# Patient Record
Sex: FEMALE | Race: ASIAN | NOT HISPANIC OR LATINO | Employment: OTHER
[De-identification: names, ages, dates, MRNs, and addresses within clinical notes are randomized per-mention and may not be internally consistent; named-entity substitution may affect disease eponyms.]

---

## 2017-06-10 ENCOUNTER — HEALTH MAINTENANCE LETTER (OUTPATIENT)
Age: 57
End: 2017-06-10

## 2017-07-06 ENCOUNTER — SURGERY (OUTPATIENT)
Age: 57
End: 2017-07-06

## 2017-07-06 ENCOUNTER — HOSPITAL ENCOUNTER (OUTPATIENT)
Facility: CLINIC | Age: 57
Discharge: HOME | End: 2017-07-06
Attending: COLON & RECTAL SURGERY | Admitting: COLON & RECTAL SURGERY
Payer: COMMERCIAL

## 2017-07-06 VITALS
WEIGHT: 120 LBS | HEIGHT: 61 IN | DIASTOLIC BLOOD PRESSURE: 71 MMHG | SYSTOLIC BLOOD PRESSURE: 126 MMHG | RESPIRATION RATE: 12 BRPM | BODY MASS INDEX: 22.66 KG/M2 | OXYGEN SATURATION: 98 %

## 2017-07-06 LAB — COLONOSCOPY: NORMAL

## 2017-07-06 PROCEDURE — G0500 MOD SEDAT ENDO SERVICE >5YRS: HCPCS | Performed by: COLON & RECTAL SURGERY

## 2017-07-06 PROCEDURE — 45378 DIAGNOSTIC COLONOSCOPY: CPT | Performed by: COLON & RECTAL SURGERY

## 2017-07-06 PROCEDURE — G0121 COLON CA SCRN NOT HI RSK IND: HCPCS | Performed by: COLON & RECTAL SURGERY

## 2017-07-06 PROCEDURE — 25000128 H RX IP 250 OP 636: Performed by: COLON & RECTAL SURGERY

## 2017-07-06 RX ORDER — FENTANYL CITRATE 50 UG/ML
INJECTION, SOLUTION INTRAMUSCULAR; INTRAVENOUS PRN
Status: DISCONTINUED | OUTPATIENT
Start: 2017-07-06 | End: 2017-07-06 | Stop reason: HOSPADM

## 2017-07-06 RX ORDER — NALOXONE HYDROCHLORIDE 0.4 MG/ML
.1-.4 INJECTION, SOLUTION INTRAMUSCULAR; INTRAVENOUS; SUBCUTANEOUS
Status: DISCONTINUED | OUTPATIENT
Start: 2017-07-06 | End: 2017-07-06 | Stop reason: HOSPADM

## 2017-07-06 RX ORDER — FLUMAZENIL 0.1 MG/ML
0.2 INJECTION, SOLUTION INTRAVENOUS
Status: DISCONTINUED | OUTPATIENT
Start: 2017-07-06 | End: 2017-07-06 | Stop reason: HOSPADM

## 2017-07-06 RX ORDER — LIDOCAINE 40 MG/G
CREAM TOPICAL
Status: DISCONTINUED | OUTPATIENT
Start: 2017-07-06 | End: 2017-07-06 | Stop reason: HOSPADM

## 2017-07-06 RX ORDER — SODIUM CHLORIDE 9 MG/ML
INJECTION, SOLUTION INTRAVENOUS CONTINUOUS PRN
Status: DISCONTINUED | OUTPATIENT
Start: 2017-07-06 | End: 2017-07-06 | Stop reason: HOSPADM

## 2017-07-06 RX ORDER — ONDANSETRON 2 MG/ML
4 INJECTION INTRAMUSCULAR; INTRAVENOUS EVERY 6 HOURS PRN
Status: DISCONTINUED | OUTPATIENT
Start: 2017-07-06 | End: 2017-07-06 | Stop reason: HOSPADM

## 2017-07-06 RX ORDER — ONDANSETRON 4 MG/1
4 TABLET, ORALLY DISINTEGRATING ORAL EVERY 6 HOURS PRN
Status: DISCONTINUED | OUTPATIENT
Start: 2017-07-06 | End: 2017-07-06 | Stop reason: HOSPADM

## 2017-07-06 RX ORDER — ONDANSETRON 2 MG/ML
4 INJECTION INTRAMUSCULAR; INTRAVENOUS
Status: DISCONTINUED | OUTPATIENT
Start: 2017-07-06 | End: 2017-07-06 | Stop reason: HOSPADM

## 2017-07-06 RX ADMIN — ONDANSETRON 4 MG: 2 SOLUTION INTRAMUSCULAR; INTRAVENOUS at 11:16

## 2017-07-06 RX ADMIN — MIDAZOLAM HYDROCHLORIDE 2 MG: 1 INJECTION, SOLUTION INTRAMUSCULAR; INTRAVENOUS at 10:30

## 2017-07-06 RX ADMIN — FENTANYL CITRATE 100 MCG: 50 INJECTION, SOLUTION INTRAMUSCULAR; INTRAVENOUS at 10:30

## 2017-07-06 RX ADMIN — SODIUM CHLORIDE 1000 ML: 9 INJECTION, SOLUTION INTRAVENOUS at 11:41

## 2017-07-06 NOTE — H&P
Pre-Endoscopy History and Physical     José Manuel Cuadra MRN# [id number]   YOB: 1960 Age: 56 year old     Date of Procedure: (Not on file)  Primary care provider: Tong Godfrey  Type of Endoscopy: Colonoscopy  Reason for Procedure: History of colon polyps  Type of Anesthesia Anticipated: Moderate Sedation    HPI:    José Manuel is a 56 year old female who will be undergoing the above procedure.      A history and physical has been performed. The patient's medications and allergies have been reviewed. The risks and benefits of the procedure and the sedation options and risks were discussed with the patient.  All questions were answered and informed consent was obtained.      She denies a personal or family history of anesthesia complications or bleeding disorders.     Allergies   Allergen Reactions     Aspirin      stuffy nose     Ibuprofen      Hard to breathe, cannot swallow     Sertraline Hcl      don't remember reaction          No current facility-administered medications on file prior to encounter.   Current Outpatient Prescriptions on File Prior to Encounter:  NEW MED    NEW MED    methylPREDNISolone (MEDROL DOSEPAK) 4 MG tablet Follow package instructions   DIPROLENE 0.05 % EX GEL apply to rash twice daily   ROBITUSSIN A-C  MG/5ML OR SYRP 2 tsp po q hs       Patient Active Problem List   Diagnosis     Other psoriasis     Left-sided low back pain with left-sided sciatica        Past Medical History:   Diagnosis Date     Acquired absence of organ, genital organs      Allergic rhinitis, cause unspecified      Other psoriasis      Plantar fascial fibromatosis      Stroke (H) 2004     Thyroid disease      Urticaria, unspecified         Past Surgical History:   Procedure Laterality Date     C NONSPECIFIC PROCEDURE  7/3/01    vaginal hysterectomy       Social History   Substance Use Topics     Smoking status: Never Smoker     Smokeless tobacco: Not on file     Alcohol use No       Family History  "  Problem Relation Age of Onset     CEREBROVASCULAR DISEASE No family hx of      C.A.D. No family hx of        REVIEW OF SYSTEMS:     5 point ROS negative except as noted above in HPI, including Gen., Resp., CV, GI &  system review.      PHYSICAL EXAM:   /80  Resp 16  Ht 1.549 m (5' 1\")  Wt 54.4 kg (120 lb)  SpO2 99%  BMI 22.67 kg/m2 Estimated body mass index is 22.67 kg/(m^2) as calculated from the following:    Height as of this encounter: 1.549 m (5' 1\").    Weight as of this encounter: 54.4 kg (120 lb).   GENERAL APPEARANCE: healthy and alert  MENTAL STATUS: alert  RESP: lungs clear to auscultation - no rales, rhonchi or wheezes  CV: regular rates and rhythm and normal S1 S2, no S3 or S4      IMPRESSION   ASA Class 2 - Mild systemic disease        PLAN:     Plan for colonoscopy. We discussed the risks, benefits and alternatives and the patient wished to proceed.    The above has been forwarded to the consulting provider.      Pedro Pablo Johnson MD  [facility]  Phone: [phone]  July 6, 2017    "

## 2022-01-03 ENCOUNTER — APPOINTMENT (OUTPATIENT)
Dept: CT IMAGING | Facility: CLINIC | Age: 62
End: 2022-01-03
Attending: EMERGENCY MEDICINE
Payer: COMMERCIAL

## 2022-01-03 ENCOUNTER — HOSPITAL ENCOUNTER (OUTPATIENT)
Facility: CLINIC | Age: 62
Setting detail: OBSERVATION
Discharge: HOME | End: 2022-01-05
Attending: EMERGENCY MEDICINE | Admitting: STUDENT IN AN ORGANIZED HEALTH CARE EDUCATION/TRAINING PROGRAM
Payer: COMMERCIAL

## 2022-01-03 DIAGNOSIS — U07.1 COVID-19: Primary | ICD-10-CM

## 2022-01-03 DIAGNOSIS — S01.81XA FACIAL LACERATION, INITIAL ENCOUNTER: ICD-10-CM

## 2022-01-03 DIAGNOSIS — U07.1 INFECTION DUE TO 2019 NOVEL CORONAVIRUS: ICD-10-CM

## 2022-01-03 DIAGNOSIS — R55 SYNCOPE, UNSPECIFIED SYNCOPE TYPE: ICD-10-CM

## 2022-01-03 DIAGNOSIS — R79.89 ELEVATED TROPONIN: ICD-10-CM

## 2022-01-03 DIAGNOSIS — D72.819 LEUKOPENIA, UNSPECIFIED TYPE: ICD-10-CM

## 2022-01-03 DIAGNOSIS — R94.31 T WAVE INVERSION IN EKG: ICD-10-CM

## 2022-01-03 DIAGNOSIS — S02.2XXA CLOSED FRACTURE OF NASAL BONE, INITIAL ENCOUNTER: ICD-10-CM

## 2022-01-03 LAB
ANION GAP SERPL CALCULATED.3IONS-SCNC: 7 MMOL/L (ref 3–14)
ATRIAL RATE - MUSE: 63 BPM
ATRIAL RATE - MUSE: 69 BPM
BUN SERPL-MCNC: 13 MG/DL (ref 7–30)
CALCIUM SERPL-MCNC: 9.5 MG/DL (ref 8.5–10.1)
CHLORIDE BLD-SCNC: 105 MMOL/L (ref 94–109)
CO2 SERPL-SCNC: 27 MMOL/L (ref 20–32)
CREAT SERPL-MCNC: 0.67 MG/DL (ref 0.52–1.04)
D DIMER PPP FEU-MCNC: 0.69 UG/ML FEU (ref 0–0.5)
DIASTOLIC BLOOD PRESSURE - MUSE: NORMAL MMHG
DIASTOLIC BLOOD PRESSURE - MUSE: NORMAL MMHG
ERYTHROCYTE [DISTWIDTH] IN BLOOD BY AUTOMATED COUNT: 13.2 % (ref 10–15)
FLUAV RNA SPEC QL NAA+PROBE: NEGATIVE
FLUBV RNA RESP QL NAA+PROBE: NEGATIVE
GFR SERPL CREATININE-BSD FRML MDRD: >90 ML/MIN/1.73M2
GLUCOSE BLD-MCNC: 121 MG/DL (ref 70–99)
HCT VFR BLD AUTO: 45 % (ref 35–47)
HGB BLD-MCNC: 14.6 G/DL (ref 11.7–15.7)
HOLD SPECIMEN: NORMAL
INTERPRETATION ECG - MUSE: NORMAL
INTERPRETATION ECG - MUSE: NORMAL
LACTATE SERPL-SCNC: 1.5 MMOL/L (ref 0.7–2)
MCH RBC QN AUTO: 28.8 PG (ref 26.5–33)
MCHC RBC AUTO-ENTMCNC: 32.4 G/DL (ref 31.5–36.5)
MCV RBC AUTO: 89 FL (ref 78–100)
P AXIS - MUSE: -50 DEGREES
P AXIS - MUSE: 71 DEGREES
PLATELET # BLD AUTO: 220 10E3/UL (ref 150–450)
POTASSIUM BLD-SCNC: 3.4 MMOL/L (ref 3.4–5.3)
PR INTERVAL - MUSE: 134 MS
PR INTERVAL - MUSE: 150 MS
QRS DURATION - MUSE: 86 MS
QRS DURATION - MUSE: 90 MS
QT - MUSE: 402 MS
QT - MUSE: 412 MS
QTC - MUSE: 421 MS
QTC - MUSE: 430 MS
R AXIS - MUSE: 70 DEGREES
R AXIS - MUSE: 73 DEGREES
RBC # BLD AUTO: 5.07 10E6/UL (ref 3.8–5.2)
SARS-COV-2 RNA RESP QL NAA+PROBE: POSITIVE
SODIUM SERPL-SCNC: 139 MMOL/L (ref 133–144)
SYSTOLIC BLOOD PRESSURE - MUSE: NORMAL MMHG
SYSTOLIC BLOOD PRESSURE - MUSE: NORMAL MMHG
T AXIS - MUSE: 12 DEGREES
T AXIS - MUSE: 17 DEGREES
TROPONIN I SERPL HS-MCNC: 78 NG/L
TROPONIN I SERPL HS-MCNC: 79 NG/L
TROPONIN I SERPL HS-MCNC: 88 NG/L
VENTRICULAR RATE- MUSE: 63 BPM
VENTRICULAR RATE- MUSE: 69 BPM
WBC # BLD AUTO: 3.1 10E3/UL (ref 4–11)

## 2022-01-03 PROCEDURE — 70486 CT MAXILLOFACIAL W/O DYE: CPT

## 2022-01-03 PROCEDURE — 71275 CT ANGIOGRAPHY CHEST: CPT

## 2022-01-03 PROCEDURE — 85027 COMPLETE CBC AUTOMATED: CPT | Performed by: EMERGENCY MEDICINE

## 2022-01-03 PROCEDURE — 258N000003 HC RX IP 258 OP 636: Performed by: EMERGENCY MEDICINE

## 2022-01-03 PROCEDURE — 84484 ASSAY OF TROPONIN QUANT: CPT | Performed by: STUDENT IN AN ORGANIZED HEALTH CARE EDUCATION/TRAINING PROGRAM

## 2022-01-03 PROCEDURE — 250N000013 HC RX MED GY IP 250 OP 250 PS 637: Performed by: STUDENT IN AN ORGANIZED HEALTH CARE EDUCATION/TRAINING PROGRAM

## 2022-01-03 PROCEDURE — 87636 SARSCOV2 & INF A&B AMP PRB: CPT | Performed by: EMERGENCY MEDICINE

## 2022-01-03 PROCEDURE — 83605 ASSAY OF LACTIC ACID: CPT | Performed by: EMERGENCY MEDICINE

## 2022-01-03 PROCEDURE — 93005 ELECTROCARDIOGRAM TRACING: CPT | Mod: 76

## 2022-01-03 PROCEDURE — 80048 BASIC METABOLIC PNL TOTAL CA: CPT | Performed by: EMERGENCY MEDICINE

## 2022-01-03 PROCEDURE — C9803 HOPD COVID-19 SPEC COLLECT: HCPCS

## 2022-01-03 PROCEDURE — 70450 CT HEAD/BRAIN W/O DYE: CPT

## 2022-01-03 PROCEDURE — 85379 FIBRIN DEGRADATION QUANT: CPT | Performed by: EMERGENCY MEDICINE

## 2022-01-03 PROCEDURE — 84484 ASSAY OF TROPONIN QUANT: CPT | Performed by: EMERGENCY MEDICINE

## 2022-01-03 PROCEDURE — 93005 ELECTROCARDIOGRAM TRACING: CPT

## 2022-01-03 PROCEDURE — 36415 COLL VENOUS BLD VENIPUNCTURE: CPT | Performed by: STUDENT IN AN ORGANIZED HEALTH CARE EDUCATION/TRAINING PROGRAM

## 2022-01-03 PROCEDURE — G0378 HOSPITAL OBSERVATION PER HR: HCPCS

## 2022-01-03 PROCEDURE — 250N000013 HC RX MED GY IP 250 OP 250 PS 637: Performed by: EMERGENCY MEDICINE

## 2022-01-03 PROCEDURE — 250N000009 HC RX 250: Performed by: EMERGENCY MEDICINE

## 2022-01-03 PROCEDURE — 250N000011 HC RX IP 250 OP 636: Performed by: EMERGENCY MEDICINE

## 2022-01-03 PROCEDURE — 96361 HYDRATE IV INFUSION ADD-ON: CPT

## 2022-01-03 PROCEDURE — 99285 EMERGENCY DEPT VISIT HI MDM: CPT | Mod: 25

## 2022-01-03 PROCEDURE — 36415 COLL VENOUS BLD VENIPUNCTURE: CPT | Performed by: EMERGENCY MEDICINE

## 2022-01-03 PROCEDURE — 99220 PR INITIAL OBSERVATION CARE,LEVEL III: CPT | Performed by: STUDENT IN AN ORGANIZED HEALTH CARE EDUCATION/TRAINING PROGRAM

## 2022-01-03 RX ORDER — ONDANSETRON 2 MG/ML
4 INJECTION INTRAMUSCULAR; INTRAVENOUS EVERY 6 HOURS PRN
Status: DISCONTINUED | OUTPATIENT
Start: 2022-01-03 | End: 2022-01-05 | Stop reason: HOSPADM

## 2022-01-03 RX ORDER — ALBUTEROL SULFATE 90 UG/1
2 AEROSOL, METERED RESPIRATORY (INHALATION) EVERY 6 HOURS
COMMUNITY

## 2022-01-03 RX ORDER — ACETAMINOPHEN 325 MG/1
650 TABLET ORAL EVERY 4 HOURS PRN
Status: DISCONTINUED | OUTPATIENT
Start: 2022-01-03 | End: 2022-01-05 | Stop reason: HOSPADM

## 2022-01-03 RX ORDER — ACETAMINOPHEN 500 MG
1000 TABLET ORAL ONCE
Status: COMPLETED | OUTPATIENT
Start: 2022-01-03 | End: 2022-01-03

## 2022-01-03 RX ORDER — METOPROLOL SUCCINATE 50 MG/1
50 TABLET, EXTENDED RELEASE ORAL DAILY
Status: DISCONTINUED | OUTPATIENT
Start: 2022-01-04 | End: 2022-01-05 | Stop reason: HOSPADM

## 2022-01-03 RX ORDER — HYDROCHLOROTHIAZIDE 12.5 MG/1
12.5 TABLET ORAL DAILY
Status: DISCONTINUED | OUTPATIENT
Start: 2022-01-03 | End: 2022-01-04 | Stop reason: CLARIF

## 2022-01-03 RX ORDER — LIDOCAINE 40 MG/G
CREAM TOPICAL
Status: DISCONTINUED | OUTPATIENT
Start: 2022-01-03 | End: 2022-01-05 | Stop reason: HOSPADM

## 2022-01-03 RX ORDER — ACETAMINOPHEN 650 MG/1
650 SUPPOSITORY RECTAL EVERY 4 HOURS PRN
Status: DISCONTINUED | OUTPATIENT
Start: 2022-01-03 | End: 2022-01-05 | Stop reason: HOSPADM

## 2022-01-03 RX ORDER — NITROGLYCERIN 0.4 MG/1
0.4 TABLET SUBLINGUAL EVERY 5 MIN PRN
Status: DISCONTINUED | OUTPATIENT
Start: 2022-01-03 | End: 2022-01-05 | Stop reason: HOSPADM

## 2022-01-03 RX ORDER — HYDROCHLOROTHIAZIDE 12.5 MG/1
12.5 TABLET ORAL DAILY
COMMUNITY

## 2022-01-03 RX ORDER — METOPROLOL SUCCINATE 50 MG/1
50 TABLET, EXTENDED RELEASE ORAL DAILY
Status: DISCONTINUED | OUTPATIENT
Start: 2022-01-03 | End: 2022-01-03

## 2022-01-03 RX ORDER — FLUTICASONE PROPIONATE 50 MCG
1 SPRAY, SUSPENSION (ML) NASAL DAILY
COMMUNITY

## 2022-01-03 RX ORDER — PROCHLORPERAZINE MALEATE 5 MG
10 TABLET ORAL EVERY 6 HOURS PRN
Status: DISCONTINUED | OUTPATIENT
Start: 2022-01-03 | End: 2022-01-05 | Stop reason: HOSPADM

## 2022-01-03 RX ORDER — ONDANSETRON 4 MG/1
4 TABLET, ORALLY DISINTEGRATING ORAL EVERY 6 HOURS PRN
Status: DISCONTINUED | OUTPATIENT
Start: 2022-01-03 | End: 2022-01-05 | Stop reason: HOSPADM

## 2022-01-03 RX ORDER — METOPROLOL SUCCINATE 50 MG/1
50 TABLET, EXTENDED RELEASE ORAL DAILY
COMMUNITY

## 2022-01-03 RX ORDER — IOPAMIDOL 755 MG/ML
56 INJECTION, SOLUTION INTRAVASCULAR ONCE
Status: COMPLETED | OUTPATIENT
Start: 2022-01-03 | End: 2022-01-03

## 2022-01-03 RX ORDER — PROCHLORPERAZINE 25 MG
25 SUPPOSITORY, RECTAL RECTAL EVERY 12 HOURS PRN
Status: DISCONTINUED | OUTPATIENT
Start: 2022-01-03 | End: 2022-01-05 | Stop reason: HOSPADM

## 2022-01-03 RX ORDER — LEVOTHYROXINE SODIUM 75 UG/1
75 TABLET ORAL DAILY
COMMUNITY

## 2022-01-03 RX ORDER — LEVOTHYROXINE SODIUM 75 UG/1
75 TABLET ORAL DAILY
Status: DISCONTINUED | OUTPATIENT
Start: 2022-01-04 | End: 2022-01-05 | Stop reason: HOSPADM

## 2022-01-03 RX ADMIN — IOPAMIDOL 56 ML: 755 INJECTION, SOLUTION INTRAVENOUS at 17:31

## 2022-01-03 RX ADMIN — SODIUM CHLORIDE 83 ML: 9 INJECTION, SOLUTION INTRAVENOUS at 17:31

## 2022-01-03 RX ADMIN — ACETAMINOPHEN 1000 MG: 500 TABLET, FILM COATED ORAL at 14:53

## 2022-01-03 RX ADMIN — SODIUM CHLORIDE 1000 ML: 9 INJECTION, SOLUTION INTRAVENOUS at 13:11

## 2022-01-03 RX ADMIN — HYDROCHLOROTHIAZIDE 12.5 MG: 12.5 TABLET ORAL at 20:53

## 2022-01-03 ASSESSMENT — ENCOUNTER SYMPTOMS
NECK PAIN: 0
DIARRHEA: 0
VOMITING: 0
BACK PAIN: 0
NAUSEA: 0
COUGH: 1
SHORTNESS OF BREATH: 0
SORE THROAT: 0

## 2022-01-03 NOTE — ED PROVIDER NOTES
History     Chief Complaint:  Loss of Consciousness      The history is provided by the patient.      José Manuel Cuadra is a 61 year old female with HTN who presents after a loss of consciousness. José Manuel was exposed to COVID 4 days ago and the same day developed a cough. She was reportedly told she had to wait 7 days for a COVID-19 test. Just prior to arrival, she was in the grocery store when she felt lightheaded and had a syncopal episode. She did hit her head during the fall and has a small facial laceration. She had epistaxis which has since resolved. Her last tetanus was in 2017. She has pain in her head/face and ill defined left hand pain but denies back or neck pain nor other injuries related to the fall. She has no vision changes, nausea, or vomiting. She has no shortness of breath, leg swelling, diarrhea, or sore throat. She is vaccinated against COVID-19 with a booster.    Review of Systems   HENT: Positive for facial swelling (nose) and nosebleeds (resolved). Negative for sore throat.    Eyes: Negative for visual disturbance.   Respiratory: Positive for cough. Negative for shortness of breath.    Cardiovascular: Negative for leg swelling.   Gastrointestinal: Negative for diarrhea, nausea and vomiting.   Musculoskeletal: Negative for back pain and neck pain.        Left hand pain   Skin: Positive for wound.   Neurological: Positive for syncope, light-headedness and headaches.   All other systems reviewed and are negative.    Allergies:  Aspirin  Ibuprofen  Sertraline Hcl  Amlodipine  Lisinopril  Losartan  Morphine  Mushrooms  Nsaids  Seafood    Medications:    Levothyroxine  Metoprolol  Hydrochlorothiazide  Albuterol    Past Medical History:    Acquired absence of organ, genital organs  Allergic rhinitis  Psoriasis  Plantar fascial fibromatosis  Stroke  Thyroid disease  Urticaria  Tricompartmental osteoarthritis of left knee  HTN    Past Surgical History:    Colonoscopy  Vaginal hysterectomy    Social  History:  Patient presents to the ED alone  Patient exercises regularly  Does not smoke.  Has COVID-19 vaccine and booster.  Does not have influenza vaccine.    Physical Exam     Patient Vitals for the past 24 hrs:   BP Temp Temp src Pulse Resp SpO2   01/03/22 1700 (!) 147/69 -- -- 72 -- 98 %   01/03/22 1430 -- -- -- 75 20 --   01/03/22 1400 (!) 180/88 -- -- 66 23 --   01/03/22 1300 (!) 171/68 -- -- 61 13 100 %   01/03/22 1244 (!) 172/109 98.7  F (37.1  C) Oral 63 16 100 %     Orthostatics  Lying /80  HR 75 bpm  Standing /80  HR 73 bpm    Physical Exam  General: Well-developed and well-nourished. Well appearing middle aged  woman. Cooperative.  Head:  Superficial small laceration just above the left eyebrow.  Eyes:  Conjunctivae, lids, and sclerae are normal. PERRL.  ENT:    Edema and early ecchymosis over the bridge of the nose with evidence of recent epistaxis.  No septal hematoma.  No active epistaxis. Moist mucous membranes.  Neck:  Supple. Normal range of motion. No midline tenderness.  CV:  Regular rate and rhythm. Normal heart sounds with no murmurs, rubs, or gallops detected.  Resp:  No respiratory distress. Clear to auscultation bilaterally without decreased breath sounds, wheezing, rales, or rhonchi.  GI:  Soft. Non-distended. Non-tender.    MS:  Normal ROM. No bilateral lower extremity edema.  No tenderness throughout the extremities including the left fingers and hand.  Skin:  Warm. Non-diaphoretic. No pallor. Small facial laceration as above.  Neuro: Awake. A&Ox3. Normal strength.    Sensation intact to light touch.    No facial droop. No dysarthria.    No aphasia.  Psych:  Normal mood and affect. Normal speech.  Vitals reviewed.    Emergency Department Course   EKG#1  Indication: loss of consciousness   Time: 1326  Rate 69 bpm. OR interval 150. QRS duration 90. QT/QTc 402/430.   Sinus rhythm  Biatrial enlargement  Left ventricular hypertrophy with repolarization abnormality  No acute  ST changes.  No prior for comparison.    EKG #2  Indication: loss of consciousness   Time: 1702  Rate 63 bpm. CT interval 134. QRS duration 86. QT/QTc 412/421.   Unusual P axis, possible ectopic atrial rhythm  Voltage criteria for left ventricular hypertrophy  ST & T wave abnormality, consider inferior ischemia   Artifact in V3  No acute ST changes.  No change as compared to prior, EKG #1.    Imaging:  CT Chest Pulmonary Embolism w Contrast   Final Result   IMPRESSION:   1.  No evidence for pulmonary embolism or other acute abnormality in the chest.      CT Facial Bones without Contrast   Final Result   IMPRESSION: Age-indeterminate bilateral nasal bone fractures. No other   facial bone fractures appreciated.       KAMLA ASHRAF MD            SYSTEM ID:  [id number]      CT Head w/o Contrast   Final Result   IMPRESSION:      1. No evidence of acute intracranial hemorrhage, mass, or herniation.   2. Punctate calcification in the right frontal lobe probably due to   previous infectious, ischemic, or inflammatory insult.         KAMLA ASHRAF MD            SYSTEM ID:  [id number]          Laboratory:  Labs Ordered and Resulted from Time of ED Arrival to Time of ED Departure   CBC WITH PLATELETS - Abnormal       Result Value    WBC Count 3.1 (*)     RBC Count 5.07      Hemoglobin 14.6      Hematocrit 45.0      MCV 89      MCH 28.8      MCHC 32.4      RDW 13.2      Platelet Count 220     BASIC METABOLIC PANEL - Abnormal    Sodium 139      Potassium 3.4      Chloride 105      Carbon Dioxide (CO2) 27      Anion Gap 7      Urea Nitrogen 13      Creatinine 0.67      Calcium 9.5      Glucose 121 (*)     GFR Estimate >90     TROPONIN I - Abnormal    Troponin I High Sensitivity 79 (*)    INFLUENZA A/B & SARS-COV2 PCR MULTIPLEX - Abnormal    Influenza A PCR Negative      Influenza B PCR Negative      SARS CoV2 PCR Positive (*)    TROPONIN I - Abnormal    Troponin I High Sensitivity 78 (*)    D DIMER QUANTITATIVE - Abnormal    D-Dimer  Quantitative 0.69 (*)    LACTIC ACID WHOLE BLOOD - Normal    Lactic Acid 1.5       Emergency Department Course:    Reviewed:  I reviewed nursing notes, vitals, past history, and Care Everywhere.    Assessments:  1258 I obtained history and examined the patient as noted above.   1607 I rechecked the patient and explained findings. I also updated her coworker by phone at her request.    Consults:   2452 I spoke with Dr. Hong of the hospitalist service to discuss the patient's findings, presentation, and plan of care.    Interventions:  1311 NS 1000 mL IV  1453 Tylenol 1000 mg PO    Disposition:  The patient was admitted to the hospital under the care of Dr. Hong.    Impression & Plan      Medical Decision Making:  José Manuel is a 61-year-old female who was exposed to COVID-19 but has not yet been tested.  She developed cough the same day of her exposure and today she had lightheadedness followed by syncope.  She did hit her face and sustained facial injuries.  She denies other concerns aside from left hand pain that I cannot reproduce.  On exam, she appears well with a superficial laceration above her left eyebrow that does not require repair.  Her tetanus is up-to-date.  She also has edema and ecchymosis of her nose with CT of the face confirming nasal bone fractures.  Fortunately, CT of the head does not reveal intracranial hemorrhage or skull fracture.  Orthostatics are negative.  Her EKG does not reveal arrhythmia or acute ischemic changes although there are T wave inversions in II, III, aVF, and V4 through V6.  Initial troponin is mildly elevated at 79 and grossly unchanged after 3 hours at 78.  D-dimer is mildly elevated which was obtained given her syncope with EKG changes and because she was found to be COVID-19 positive.  Fortunately, CT of the chest does not reveal pulmonary embolism or other acute pathology including COVID-19 pneumonia.  The remainder of her work-up is without lactic acidosis, anemia,  kidney injury, or electrolyte derangements.  Leukopenia to 3.1 is likely related to her viral COVID-19.  She was given IV fluids as well as Tylenol.  She required no further interventions but does require hospitalization given COVID-19 with syncope, abnormal troponin, and abnormal EKG.  I discussed findings and plan with her and answered all her questions.  She verbalized understanding and is amenable.  I discussed the patient's case with Dr. Hong, hospitalist, who accepts admission and has no further orders.     Covid-19  José Manuel Cuadra was evaluated during a global COVID-19 pandemic, which necessitated consideration that the patient might be at risk for infection with the SARS-CoV-2 virus that causes COVID-19.   Applicable protocols for evaluation were followed during the patient's care.   COVID-19 was considered as part of the patient's evaluation. The plan for testing is:  a test was obtained during this visit. Test result is positive.    Diagnosis:    ICD-10-CM    1. Syncope, unspecified syncope type  R55    2. T wave inversion in EKG  R94.31    3. Infection due to 2019 novel coronavirus  U07.1    4. Elevated troponin  R77.8    5. Leukopenia, unspecified type  D72.819    6. Closed fracture of nasal bone, initial encounter  S02.2XXA    7. Facial laceration, initial encounter  S01.81XA        Scribe Disclosure:  IEvelia, am serving as a scribe at 12:57 PM on 1/3/2022 to document services personally performed by Rolanda Davis MD based on my observations and the provider's statements to me.     Alecia AGUILERA, am serving as a scribe at 5:58 PM on 1/3/2022 to document services personally performed by Rolanda Davis MD based on my observations and the provider's statements to me.      Rolanda Davis MD  01/04/22 5229

## 2022-01-03 NOTE — H&P
[facility]    History and Physical - Hospitalist Service       Date of Admission:  1/3/2022    Assessment & Plan      José Manuel Cuadra is a 61 year old female admitted on 1/3/2022. She presents after a syncopal episode.    Syncopal episode  - observation  - TTE  - Tele  - orthostatics  - PT  - expect somewhat orthostatic or vasovagal syncope exacerbated by viral prodrome    Troponin elevation  - expect demand ischemia  - hs-trop x2 was stable. No chest pain or anginal equivalents. EKG with TWI, but no prior to compare to.  - repeat trop  - tele  - monitor for symptoms  - likely will need provocative testing when recovered from covid    HTN  - continue PTA metoprolol and hydrochlorothiazide    Hypothyroid  - continue PTA synthroid     Bilateral nasal bone fractures  - age indeterminate on CT facial bone  - tylenol and oxycodone as needed for pain    COVID-19 positive  Very mildly asymptomatic (mild cough)  - monitor O2, no hypoxia noted, not on supplemental O2  - no indication for steroids, remdesivir or other meds at this point.        Diet:   regular  DVT Prophylaxis: Pneumatic Compression Devices  Pantoja Catheter: Not present  Central Lines: None  Code Status:   FULL CODE    Clinically Significant Risk Factors Present on Admission                    Disposition Plan   Expected Discharge:  1-3 days   Anticipated discharge location:  Awaiting care coordination huddle  Delays:            The patient's care was discussed with the Bedside Nurse, Patient and ED Team.    Sanjay Hong MD  [facility]  Securely message with the Vocera Web Console (learn more here)  Text page via [employer] Paging/Directory        ______________________________________________________________________    Chief Complaint   Fall    History is obtained from the patient, electronic health record and emergency department physician    History of Present Illness   José Manuel Cuadra is a 61 year old female  who has hx of HTN, hypothyroid. Presents after syncopal episode at pharmacy. Reports she was exposed to COVID one week prior and has had mild symptoms, (mild cough, aches). She did not get tested as she was told she needed to wait one week before testing. She went to the pharmacy on 1/3 to get some of her meds refilled. While standing in line she had blurry vision and sat down. The next thing she remembers is lying on the ground.  She was brought by EMS to ED.    Review of Systems    The 10 point Review of Systems is negative other than noted in the HPI or here.     Past Medical History    I have reviewed this patient's medical history and updated it with pertinent information if needed.   Past Medical History:   Diagnosis Date     Acquired absence of organ, genital organs      Allergic rhinitis, cause unspecified      Other psoriasis      Plantar fascial fibromatosis      Stroke (H) 2004     Thyroid disease      Urticaria, unspecified        Past Surgical History   I have reviewed this patient's surgical history and updated it with pertinent information if needed.  Past Surgical History:   Procedure Laterality Date     COLONOSCOPY N/A 7/6/2017    Procedure: COLONOSCOPY;  [facility] ;  Surgeon: Pedro Pablo Johnson MD;  Location: [facility] NONSPECIFIC PROCEDURE  7/3/01    vaginal hysterectomy       Social History   I have reviewed this patient's social history and updated it with pertinent information if needed.  Social History     Tobacco Use     Smoking status: Never Smoker     Smokeless tobacco: Not on file   Substance Use Topics     Alcohol use: No     Drug use: No       Family History     No reported sudden cardiac death    Prior to Admission Medications   Prior to Admission Medications   Prescriptions Last Dose Informant Patient Reported? Taking?   albuterol (PROAIR HFA/PROVENTIL HFA/VENTOLIN HFA) 108 (90 Base) MCG/ACT inhaler   Yes Yes   Sig: Inhale 2 puffs into the lungs every 6  hours   calcium carbonate-vitamin D (OSCAL W/D) 500-200 MG-UNIT tablet 1/2/2022 at Unknown time  Yes Yes   Sig: Take 1 tablet by mouth 2 times daily   fluticasone (FLONASE) 50 MCG/ACT nasal spray 1/2/2022 at Unknown time  Yes Yes   Sig: Spray 1 spray into both nostrils daily   hydrochlorothiazide (HYDRODIURIL) 12.5 MG tablet 1/2/2022 at Unknown time  Yes Yes   Sig: Take 12.5 mg by mouth daily   levothyroxine (SYNTHROID/LEVOTHROID) 75 MCG tablet 1/3/2022 at Unknown time  Yes Yes   Sig: Take 75 mcg by mouth daily   metoprolol succinate ER (TOPROL-XL) 50 MG 24 hr tablet 1/2/2022 at Unknown time  Yes Yes   Sig: Take 50 mg by mouth daily      Facility-Administered Medications: None     Allergies   Allergies   Allergen Reactions     Aspirin      stuffy nose     Ibuprofen      Hard to breathe, cannot swallow     Sertraline Hcl      don't remember reaction       Physical Exam   Vital Signs: Temp: 98.7  F (37.1  C) Temp src: Oral BP: (!) 147/69 Pulse: 72   Resp: 20 SpO2: 98 % O2 Device: None (Room air)    Weight: 0 lbs 0 oz    Constitutional: Awake, alert, cooperative, no apparent cardiopulmonary distress.  Eyes: Conjunctiva and pupils examined and normal.  HEENT: Moist mucous membranes, normal dentition. Small abrasions above left eyebrow.   Respiratory: Clear to auscultation bilaterally, no crackles or wheezing.  Cardiovascular: Regular rate and rhythm, normal S1 and S2, and no murmur noted.  GI: Soft, non-distended, non-tender, normal bowel sounds.  Lymph/Hematologic: No anterior cervical or supraclavicular adenopathy.  Skin: No rashes, no cyanosis, no edema noted on exposed skin.  Musculoskeletal: No joint swelling, erythema or tenderness. No gross bony abnormalities  Neurologic: Cranial nerves 2-12 grossly intact, normal strength and sensation.  Psychiatric: Alert, oriented to person, place and time, no obvious anxiety or depression.      Data   Data reviewed today: I reviewed all medications, new labs and imaging  results over the last 24 hours. I personally reviewed the EKG tracing showing TWI, the chest CT image(s) showing no acute pathology and the head CT image(s) showing no acute pathology.    Recent Labs   Lab 01/03/22  1256   WBC 3.1*   HGB 14.6   MCV 89         POTASSIUM 3.4   CHLORIDE 105   CO2 27   BUN 13   CR 0.67   ANIONGAP 7   MAVERICK 9.5   *     Recent Results (from the past 24 hour(s))   CT Head w/o Contrast    Narrative    CT SCAN OF THE HEAD WITHOUT CONTRAST   1/3/2022 2:32 PM     HISTORY: Facial trauma. Syncope.    TECHNIQUE:  Axial images of the head and coronal reformations without  IV contrast material. Radiation dose for this scan was reduced using  automated exposure control, adjustment of the mA and/or kV according  to patient size, or iterative reconstruction technique.    COMPARISON: Brain MR 7/6/2004.    FINDINGS: No evidence of acute intracranial hemorrhage. No mass effect  or midline shift. Indeterminate calcification in the right frontal  lobe. No mass effect or midline shift. No abnormal extra axial fluid  collection. Ventricular size is within normal limits without evidence  of hydrocephalus.    Please see dedicated facial bone CT for details of the facial bones.      Impression    IMPRESSION:     1. No evidence of acute intracranial hemorrhage, mass, or herniation.  2. Punctate calcification in the right frontal lobe probably due to  previous infectious, ischemic, or inflammatory insult.      KAMLA ASHRAF MD         SYSTEM ID:  [id number]   CT Facial Bones without Contrast    Narrative    CT SCAN OF THE FACE WITHOUT CONTRAST January 3, 2022 2:32 PM     HISTORY: Facial trauma.     TECHNIQUE: Axial CT images of the facial bones were completed with  sagittal and coronal reformations. Radiation dose for this scan was  reduced using automated exposure control, adjustment of the mA and/or  kV according to patient size, or iterative reconstruction technique.     COMPARISON: None.      FINDINGS:  No significant soft tissue swelling. Age-indeterminate  bilateral nasal bone fractures which are mildly displaced. No other  facial bone fractures are appreciated.     The visualized paranasal sinuses are clear.     No mass identified within the visualized soft tissues of the neck. The  visualized intracranial structures are unremarkable.       Impression    IMPRESSION: Age-indeterminate bilateral nasal bone fractures. No other  facial bone fractures appreciated.     KAMLA ASHRAF MD         SYSTEM ID:  [id number]   CT Chest Pulmonary Embolism w Contrast    Narrative    EXAM: CT CHEST PULMONARY EMBOLISM W CONTRAST  LOCATION: [facility]  DATE/TIME: 1/3/2022 5:25 PM    INDICATION: PE suspected, low/intermediate prob, positive D-dimer  COMPARISON: None.  TECHNIQUE: CT chest pulmonary angiogram during arterial phase injection of IV contrast. Multiplanar reformats and MIP reconstructions were performed. Dose reduction techniques were used.   CONTRAST: 56 mL Isovue-370    FINDINGS:  ANGIOGRAM CHEST: No evidence for pulmonary embolism. Pulmonary arteries normal in caliber. Thoracic aorta normal in caliber. No aortic dissection or other acute abnormality.    HEART: Cardiac chambers within normal limits. No pericardial effusion. Mild coronary artery calcification.    MEDIASTINUM: No adenopathy or mass.    LUNGS AND PLEURA: No pulmonary mass, consolidation, or suspicious pulmonary nodule. No pleural effusion or pneumothorax.    LIMITED UPPER ABDOMEN: Negative.    MUSCULOSKELETAL: Negative.      Impression    IMPRESSION:  1.  No evidence for pulmonary embolism or other acute abnormality in the chest.

## 2022-01-03 NOTE — ED TRIAGE NOTES
Syncope episode with 30 secs of LOC while in the line at the pharmacy at a grocery store. Feeling light and dizzy

## 2022-01-03 NOTE — PHARMACY-ADMISSION MEDICATION HISTORY
Pharmacy Medication History  Admission medication history interview status for the 1/3/2022  admission is complete. See EPIC admission navigator for prior to admission medications     Location of Interview: Patient room  Medication history sources: Patient and Surescripts    Significant changes made to the medication list:   Added: metoprolol, levothyroxine, fluticasone, and HCT    Additional medication history information:    Patient doesn't know medication names or doses. Does know indications so completed medication history that way. Patient recently filled all these medications 1/2/2022 and states she takes them as it states on the bottle. States the small BP med is taken at night. Believe this may be the hydrochlorothiazide but cannot be sure.     Medication reconciliation completed by provider prior to medication history? No    Time spent in this activity: 15 minutes     Prior to Admission medications    Medication Sig Last Dose Taking? Auth Provider   albuterol (PROAIR HFA/PROVENTIL HFA/VENTOLIN HFA) 108 (90 Base) MCG/ACT inhaler Inhale 2 puffs into the lungs every 6 hours  Yes Unknown, Entered By History   calcium carbonate-vitamin D (OSCAL W/D) 500-200 MG-UNIT tablet Take 1 tablet by mouth 2 times daily 1/2/2022 at Unknown time Yes Unknown, Entered By History   fluticasone (FLONASE) 50 MCG/ACT nasal spray Spray 1 spray into both nostrils daily 1/2/2022 at Unknown time Yes Unknown, Entered By History   hydrochlorothiazide (HYDRODIURIL) 12.5 MG tablet Take 12.5 mg by mouth daily 1/2/2022 at Unknown time Yes Unknown, Entered By History   levothyroxine (SYNTHROID/LEVOTHROID) 75 MCG tablet Take 75 mcg by mouth daily 1/3/2022 at Unknown time Yes Unknown, Entered By History   metoprolol succinate ER (TOPROL-XL) 50 MG 24 hr tablet Take 50 mg by mouth daily 1/2/2022 at Unknown time Yes Unknown, Entered By History       The information provided in this note is only as accurate as the sources available at the time of  update(s)     Hannah Mcdaniels, PharmD, BCCCP

## 2022-01-04 ENCOUNTER — APPOINTMENT (OUTPATIENT)
Dept: CARDIOLOGY | Facility: CLINIC | Age: 62
End: 2022-01-04
Attending: STUDENT IN AN ORGANIZED HEALTH CARE EDUCATION/TRAINING PROGRAM
Payer: COMMERCIAL

## 2022-01-04 LAB
ANION GAP SERPL CALCULATED.3IONS-SCNC: 6 MMOL/L (ref 3–14)
ATRIAL RATE - MUSE: 70 BPM
BUN SERPL-MCNC: 10 MG/DL (ref 7–30)
CALCIUM SERPL-MCNC: 9.1 MG/DL (ref 8.5–10.1)
CHLORIDE BLD-SCNC: 104 MMOL/L (ref 94–109)
CO2 SERPL-SCNC: 26 MMOL/L (ref 20–32)
CREAT SERPL-MCNC: 0.5 MG/DL (ref 0.52–1.04)
DIASTOLIC BLOOD PRESSURE - MUSE: NORMAL MMHG
GFR SERPL CREATININE-BSD FRML MDRD: >90 ML/MIN/1.73M2
GLUCOSE BLD-MCNC: 110 MG/DL (ref 70–99)
INTERPRETATION ECG - MUSE: NORMAL
LVEF ECHO: NORMAL
P AXIS - MUSE: 67 DEGREES
POTASSIUM BLD-SCNC: 3.3 MMOL/L (ref 3.4–5.3)
POTASSIUM BLD-SCNC: 4.3 MMOL/L (ref 3.4–5.3)
PR INTERVAL - MUSE: 138 MS
QRS DURATION - MUSE: 92 MS
QT - MUSE: 402 MS
QTC - MUSE: 434 MS
R AXIS - MUSE: 61 DEGREES
SODIUM SERPL-SCNC: 136 MMOL/L (ref 133–144)
SYSTOLIC BLOOD PRESSURE - MUSE: NORMAL MMHG
T AXIS - MUSE: 3 DEGREES
VENTRICULAR RATE- MUSE: 70 BPM

## 2022-01-04 PROCEDURE — 93306 TTE W/DOPPLER COMPLETE: CPT

## 2022-01-04 PROCEDURE — 250N000013 HC RX MED GY IP 250 OP 250 PS 637: Performed by: STUDENT IN AN ORGANIZED HEALTH CARE EDUCATION/TRAINING PROGRAM

## 2022-01-04 PROCEDURE — 250N000011 HC RX IP 250 OP 636: Performed by: INTERNAL MEDICINE

## 2022-01-04 PROCEDURE — 96375 TX/PRO/DX INJ NEW DRUG ADDON: CPT | Mod: XU

## 2022-01-04 PROCEDURE — 36415 COLL VENOUS BLD VENIPUNCTURE: CPT | Performed by: STUDENT IN AN ORGANIZED HEALTH CARE EDUCATION/TRAINING PROGRAM

## 2022-01-04 PROCEDURE — 99225 PR SUBSEQUENT OBSERVATION CARE,LEVEL II: CPT | Performed by: HOSPITALIST

## 2022-01-04 PROCEDURE — 96361 HYDRATE IV INFUSION ADD-ON: CPT

## 2022-01-04 PROCEDURE — 36415 COLL VENOUS BLD VENIPUNCTURE: CPT | Performed by: HOSPITALIST

## 2022-01-04 PROCEDURE — 250N000013 HC RX MED GY IP 250 OP 250 PS 637: Performed by: HOSPITALIST

## 2022-01-04 PROCEDURE — G0378 HOSPITAL OBSERVATION PER HR: HCPCS

## 2022-01-04 PROCEDURE — 84132 ASSAY OF SERUM POTASSIUM: CPT | Mod: 91 | Performed by: HOSPITALIST

## 2022-01-04 PROCEDURE — 80048 BASIC METABOLIC PNL TOTAL CA: CPT | Performed by: STUDENT IN AN ORGANIZED HEALTH CARE EDUCATION/TRAINING PROGRAM

## 2022-01-04 PROCEDURE — 96374 THER/PROPH/DIAG INJ IV PUSH: CPT | Mod: XU

## 2022-01-04 PROCEDURE — 93306 TTE W/DOPPLER COMPLETE: CPT | Mod: 26 | Performed by: INTERNAL MEDICINE

## 2022-01-04 PROCEDURE — 999N000147 HC STATISTIC PT IP EVAL DEFER: Performed by: PHYSICAL THERAPIST

## 2022-01-04 PROCEDURE — 258N000003 HC RX IP 258 OP 636: Performed by: HOSPITALIST

## 2022-01-04 PROCEDURE — 99207 PR NO CHARGE LOS: CPT | Performed by: INTERNAL MEDICINE

## 2022-01-04 PROCEDURE — 250N000011 HC RX IP 250 OP 636: Performed by: STUDENT IN AN ORGANIZED HEALTH CARE EDUCATION/TRAINING PROGRAM

## 2022-01-04 RX ORDER — GUAIFENESIN 600 MG/1
600 TABLET, EXTENDED RELEASE ORAL 2 TIMES DAILY
Status: DISCONTINUED | OUTPATIENT
Start: 2022-01-04 | End: 2022-01-05 | Stop reason: HOSPADM

## 2022-01-04 RX ORDER — SODIUM CHLORIDE 9 MG/ML
INJECTION, SOLUTION INTRAVENOUS CONTINUOUS
Status: ACTIVE | OUTPATIENT
Start: 2022-01-04 | End: 2022-01-04

## 2022-01-04 RX ORDER — LORAZEPAM 2 MG/ML
0.25 INJECTION INTRAMUSCULAR EVERY 4 HOURS PRN
Status: DISCONTINUED | OUTPATIENT
Start: 2022-01-04 | End: 2022-01-05 | Stop reason: HOSPADM

## 2022-01-04 RX ORDER — HYDROCHLOROTHIAZIDE 12.5 MG/1
12.5 CAPSULE ORAL DAILY
Status: DISCONTINUED | OUTPATIENT
Start: 2022-01-04 | End: 2022-01-05 | Stop reason: HOSPADM

## 2022-01-04 RX ORDER — HYDRALAZINE HYDROCHLORIDE 20 MG/ML
10 INJECTION INTRAMUSCULAR; INTRAVENOUS EVERY 4 HOURS PRN
Status: DISCONTINUED | OUTPATIENT
Start: 2022-01-04 | End: 2022-01-05 | Stop reason: HOSPADM

## 2022-01-04 RX ORDER — POTASSIUM CHLORIDE 1500 MG/1
20 TABLET, EXTENDED RELEASE ORAL ONCE
Status: COMPLETED | OUTPATIENT
Start: 2022-01-04 | End: 2022-01-04

## 2022-01-04 RX ADMIN — GUAIFENESIN 600 MG: 600 TABLET ORAL at 20:02

## 2022-01-04 RX ADMIN — ACETAMINOPHEN 650 MG: 325 TABLET, FILM COATED ORAL at 16:47

## 2022-01-04 RX ADMIN — HYDRALAZINE HYDROCHLORIDE 10 MG: 20 INJECTION INTRAMUSCULAR; INTRAVENOUS at 00:14

## 2022-01-04 RX ADMIN — LORAZEPAM 0.25 MG: 2 INJECTION INTRAMUSCULAR; INTRAVENOUS at 01:44

## 2022-01-04 RX ADMIN — HYDROCHLOROTHIAZIDE 12.5 MG: 12.5 CAPSULE ORAL at 09:25

## 2022-01-04 RX ADMIN — GUAIFENESIN 600 MG: 600 TABLET ORAL at 16:48

## 2022-01-04 RX ADMIN — POTASSIUM CHLORIDE 20 MEQ: 1500 TABLET, EXTENDED RELEASE ORAL at 16:47

## 2022-01-04 RX ADMIN — METOPROLOL SUCCINATE 50 MG: 50 TABLET, EXTENDED RELEASE ORAL at 09:25

## 2022-01-04 RX ADMIN — LEVOTHYROXINE SODIUM 75 MCG: 75 TABLET ORAL at 09:25

## 2022-01-04 RX ADMIN — OXYCODONE HYDROCHLORIDE 2.5 MG: 5 TABLET ORAL at 00:15

## 2022-01-04 RX ADMIN — SODIUM CHLORIDE: 9 INJECTION, SOLUTION INTRAVENOUS at 13:53

## 2022-01-04 RX ADMIN — ONDANSETRON 4 MG: 2 INJECTION INTRAMUSCULAR; INTRAVENOUS at 00:58

## 2022-01-04 ASSESSMENT — ENCOUNTER SYMPTOMS
LIGHT-HEADEDNESS: 1
HEADACHES: 1
FACIAL SWELLING: 1
WOUND: 1

## 2022-01-04 NOTE — PROGRESS NOTES
"- Diagnostic tests and consults completed and resulted: Yes.   - No further episodes of syncope and any new arrhythmia addressed with controlled heart rates: None  - Vital signs normal or at patient baseline and orthostatic vitals are normal and patient not lightheaded with standing: Negative orthos and VSS on RA (baseline). Pt states at time she still feels \"lightheaded when ambulating\"  - Tolerating oral intake to maintain hydration: yes  - Safe disposition plan has been identified: no      Pt VSS on RA, c/o pain to bilateral hands- PRN tylenol given. SBA. Regular diet. Skin intact. New IV placed to left antecubital- NS running @ 75mL/hr. Abrasion to L eyebrow and bruising to nasal area. Tele: SR. ECHO completed this shift. Possible discharge home tomorrow.   "

## 2022-01-04 NOTE — PROGRESS NOTES
VSS. A/O. SBA. Dyspnea on exertion. IS 1250. K+ being replaced. Tele SR. Sudanese speaking. Will  Need orthostatic vitals at next vital sign check. Echo ordered.

## 2022-01-04 NOTE — PLAN OF CARE
PRIMARY DIAGNOSIS: SYNCOPE  OUTPATIENT/OBSERVATION GOALS TO BE MET BEFORE DISCHARGE:  1. Orthostatic performed: No, will check  with next VS check    2. Diagnostic testing complete & at baseline neurologic testing: No    3. Cleared by consultants (if involved): No    4. Interpretation of cardiac rhythm per telemetry tech: NSR    5. Tolerating adequate PO diet and medications: Yes    6. Return to near baseline physical activity or neurologic status: Yes    Discharge Planner Nurse   Safe discharge environment identified: Yes  Barriers to discharge: No       Entered by: Carleen Terry 01/04/2022 12:49 PM     Please review provider order for any additional goals.   Nurse to notify provider when observation goals have been met and patient is ready for discharge.

## 2022-01-04 NOTE — PROGRESS NOTES
Hospitalist Cross Cover. ED Boarder. ED14.  1/4/2022    Notified of persistent elevation in BP. Chart reviewed.     BP (!) 190/84   Pulse 58   Temp 98.7  F (37.1  C) (Oral)   Resp 20   SpO2 98%     Plan: added prn hydralazine 10 mg IV q 4 hours for SBP>170.  Discussed with Emergency Department nurse.    Kelli Mae MD

## 2022-01-04 NOTE — PROGRESS NOTES
[facility]    Medicine Progress Note - Hospitalist Service       Date of Admission:  1/3/2022    Assessment & Plan         José Manuel Cuadra is a 61 year old female admitted on 1/3/2022. She presents after a syncopal episode.     Syncopal episode  Registered to observation.  - TTE pending.  - Telemetry.  - Orthostatics negative.  - PT consulted.  - Will give IV fluids this afternoon.     Troponin elevation  Suspect due to demand ischemia.  - Serial troponins flat.  - Monitor telemetry.  - Consider stress test after she recovers from her acute illness.    COVID-19 PCR positive  Very mildly asymptomatic (mild cough).  - Monitor O2, no hypoxia noted, not on supplemental O2.  - No indication for steroids, remdesivir or other meds at this point.    Hypokalemia  - Replace and recheck per protocol.    Hypothyroidism  - Continue PTA metoprolol and hydrochlorothiazide.     Hypothyroid  - Continue PTA synthroid.     Bilateral nasal bone fractures  - Age indeterminate on CT facial bone.  - Tylenol and oxycodone as needed for pain.  - Outpatient follow-up as needed.       Diet: Combination Diet Regular Diet Adult    DVT Prophylaxis: Pneumatic Compression Devices  Pantoja Catheter: Not present  Central Lines: None  Code Status: Full Code      Disposition Plan   Expected Discharge: 01/05/2022 pending improvement in symptoms.  Anticipated discharge location:  Awaiting care coordination huddle  Delays:            The patient's care was discussed with the Bedside Nurse and Patient.    Александр Lynne MD  Hospitalist Service  [facility]  Securely message with the Vocera Web Console (learn more here)  Text page via [employer] Paging/Directory        Clinically Significant Risk Factors Present on Admission                    ______________________________________________________________________    Interval History   José Manuel Cuadra was seen today. A [place]  through the [employer]  was used. She feels tired. Also a little dizzy. Denies fevers, chest pain, shortness of breath, nausea, abdominal pain.    Data reviewed today: I reviewed all medications, new labs and imaging results over the last 24 hours. I personally reviewed no images or EKG's today.    Physical Exam   Vital Signs: Temp: 98  F (36.7  C) Temp src: Oral BP: (!) 140/70 Pulse: 60   Resp: 16 SpO2: 93 % O2 Device: None (Room air)    Weight: 115 lbs 4.81 oz  Constitutional: awake, alert, cooperative, no apparent distress, sitting at the edge of the bed  Respiratory: clear to auscultation bilaterally, no crackles or wheezing  Cardiovascular: regular rate and rhythm, normal S1 and S2, no murmur noted  GI: normal bowel sounds, soft, non-distended, non-tender  Skin: warm, dry  Musculoskeletal: no lower extremity pitting edema present  Neurologic: awake, alert, oriented to name, place and time, motor is 5 out of 5 bilaterally, sensory is intact    Data   Recent Labs   Lab 01/04/22  0542 01/03/22  1256   WBC  --  3.1*   HGB  --  14.6   MCV  --  89   PLT  --  220    139   POTASSIUM 3.3* 3.4   CHLORIDE 104 105   CO2 26 27   BUN 10 13   CR 0.50* 0.67   ANIONGAP 6 7   MAVERICK 9.1 9.5   * 121*     Medications     sodium chloride 75 mL/hr at 01/04/22 1353       guaiFENesin  600 mg Oral BID     hydrochlorothiazide  12.5 mg Oral Daily     levothyroxine  75 mcg Oral Daily     metoprolol succinate ER  50 mg Oral Daily     sodium chloride (PF)  3 mL Intracatheter Q8H

## 2022-01-04 NOTE — PROVIDER NOTIFICATION
MD Notification    Notified Person: MD    Notified Person Name: Guera    Notification Date/Time: 1/4/22 16:32    Notification Interaction: page    Purpose of Notification: Pt is asking for Mucinex. Could we get that ordered PRN please?     Orders Received: Mucinex ordered

## 2022-01-04 NOTE — PROGRESS NOTES
RECEIVING UNIT ED HANDOFF REVIEW    ED Nurse Handoff Report was reviewed by: Carleen Terry RN on January 4, 2022 at 8:30 AM

## 2022-01-04 NOTE — ED NOTES
[facility]  ED Nurse Handoff Report    ED Chief complaint: Loss of Consciousness      ED Diagnosis:   Final diagnoses:   Syncope, unspecified syncope type   T wave inversion in EKG   Infection due to 2019 novel coronavirus   Elevated troponin   Leukopenia, unspecified type   Closed fracture of nasal bone, initial encounter   Facial laceration, initial encounter       Code Status: Full Code    Allergies:   Allergies   Allergen Reactions     Aspirin      stuffy nose     Ibuprofen      Hard to breathe, cannot swallow     Sertraline Hcl      don't remember reaction       Patient Story: Patient with history of stroke  presents with LOC. The patient had a syncopal episode of about 30 seconds while in the line at the store. She has no SOB, but has a cough.  She has head pain, and left hand pain. She has no leg swelling. She has no back or neck pain. She denies smoking.   She is vaccinated against COVID-19 with a booster.    Focused Assessment:  Cards: WDL except HTn   Resp: WDL except cough, COVID  Nu[place]: WDL    Treatments and/or interventions provided: SEE MAR  Patient's response to treatments and/or interventions: Minimal changes    To be done/followed up on inpatient unit:  Obs, monitor    Does this patient have any cognitive concerns?: n/a    Activity level - Baseline/Home:  Independent  Activity Level - Current:   Stand with Assist    Patient's Preferred language: [place]   Needed?: Yes    Isolation: None and COVID r/o and special precautions  Infection: Not Applicable  COVID r/o and special precautions  Patient tested for COVID 19 prior to admission: YES  Bariatric?: No    Vital Signs:   Vitals:    01/03/22 1800 01/03/22 2100 01/03/22 2357 01/04/22 0000   BP: (!) 149/81 (!) 160/75 (!) 184/80 (!) 190/84   Pulse: 64 63 58 58   Resp: 9 18 25 20   Temp:       TempSrc:       SpO2: 98%          Cardiac Rhythm:     Was the PSS-3 completed:   Yes  What interventions are required if any?                Family Comments: n/a  OBS brochure/video discussed/provided to patient/family: Yes              Name of person given brochure if not patient: n/a              Relationship to patient: n/a    For the majority of the shift this patient's behavior was Green.   Behavioral interventions performed were n/a.    ED NURSE PHONE NUMBER: [phone]

## 2022-01-04 NOTE — PROVIDER NOTIFICATION
MD Notification    Notified Person: MD    Notified Person Name: Dr. Lynne    Notification Date/Time: 1/4 @ 0915    Notification Interaction: web page    Purpose of Notification: K+ 3.3 replace?

## 2022-01-05 VITALS
WEIGHT: 115.3 LBS | HEART RATE: 60 BPM | SYSTOLIC BLOOD PRESSURE: 156 MMHG | OXYGEN SATURATION: 95 % | TEMPERATURE: 97.4 F | DIASTOLIC BLOOD PRESSURE: 77 MMHG | HEIGHT: 62 IN | RESPIRATION RATE: 18 BRPM | BODY MASS INDEX: 21.22 KG/M2

## 2022-01-05 LAB
ANION GAP SERPL CALCULATED.3IONS-SCNC: 6 MMOL/L (ref 3–14)
BUN SERPL-MCNC: 11 MG/DL (ref 7–30)
CALCIUM SERPL-MCNC: 8.7 MG/DL (ref 8.5–10.1)
CHLORIDE BLD-SCNC: 108 MMOL/L (ref 94–109)
CO2 SERPL-SCNC: 24 MMOL/L (ref 20–32)
CREAT SERPL-MCNC: 0.59 MG/DL (ref 0.52–1.04)
ERYTHROCYTE [DISTWIDTH] IN BLOOD BY AUTOMATED COUNT: 13.2 % (ref 10–15)
GFR SERPL CREATININE-BSD FRML MDRD: >90 ML/MIN/1.73M2
GLUCOSE BLD-MCNC: 90 MG/DL (ref 70–99)
HCT VFR BLD AUTO: 43.1 % (ref 35–47)
HGB BLD-MCNC: 14.2 G/DL (ref 11.7–15.7)
MAGNESIUM SERPL-MCNC: 2.3 MG/DL (ref 1.6–2.3)
MCH RBC QN AUTO: 28.8 PG (ref 26.5–33)
MCHC RBC AUTO-ENTMCNC: 32.9 G/DL (ref 31.5–36.5)
MCV RBC AUTO: 87 FL (ref 78–100)
PLATELET # BLD AUTO: 193 10E3/UL (ref 150–450)
POTASSIUM BLD-SCNC: 3.6 MMOL/L (ref 3.4–5.3)
RBC # BLD AUTO: 4.93 10E6/UL (ref 3.8–5.2)
SODIUM SERPL-SCNC: 138 MMOL/L (ref 133–144)
WBC # BLD AUTO: 2.5 10E3/UL (ref 4–11)

## 2022-01-05 PROCEDURE — 250N000013 HC RX MED GY IP 250 OP 250 PS 637: Performed by: HOSPITALIST

## 2022-01-05 PROCEDURE — 83735 ASSAY OF MAGNESIUM: CPT | Performed by: HOSPITALIST

## 2022-01-05 PROCEDURE — 250N000013 HC RX MED GY IP 250 OP 250 PS 637: Performed by: STUDENT IN AN ORGANIZED HEALTH CARE EDUCATION/TRAINING PROGRAM

## 2022-01-05 PROCEDURE — G0378 HOSPITAL OBSERVATION PER HR: HCPCS

## 2022-01-05 PROCEDURE — 99217 PR OBSERVATION CARE DISCHARGE: CPT | Performed by: HOSPITALIST

## 2022-01-05 PROCEDURE — 80048 BASIC METABOLIC PNL TOTAL CA: CPT | Performed by: HOSPITALIST

## 2022-01-05 PROCEDURE — 96361 HYDRATE IV INFUSION ADD-ON: CPT

## 2022-01-05 PROCEDURE — 36415 COLL VENOUS BLD VENIPUNCTURE: CPT | Performed by: HOSPITALIST

## 2022-01-05 PROCEDURE — 85027 COMPLETE CBC AUTOMATED: CPT | Performed by: HOSPITALIST

## 2022-01-05 RX ORDER — GUAIFENESIN 600 MG/1
600 TABLET, EXTENDED RELEASE ORAL 2 TIMES DAILY
Qty: 20 TABLET | Refills: 0 | Status: SHIPPED | OUTPATIENT
Start: 2022-01-05 | End: 2022-01-15

## 2022-01-05 RX ADMIN — HYDROCHLOROTHIAZIDE 12.5 MG: 12.5 CAPSULE ORAL at 08:44

## 2022-01-05 RX ADMIN — GUAIFENESIN 600 MG: 600 TABLET ORAL at 08:44

## 2022-01-05 RX ADMIN — METOPROLOL SUCCINATE 50 MG: 50 TABLET, EXTENDED RELEASE ORAL at 08:44

## 2022-01-05 RX ADMIN — LEVOTHYROXINE SODIUM 75 MCG: 75 TABLET ORAL at 08:43

## 2022-01-05 NOTE — PLAN OF CARE
COVID precautions maintained. Aox4. VSS on RA ex HTN. Denies pain. Denies dizziness/lightheadedness. Moves SBA to BR. Infrequent cough, LS clear. Denies SOB. Tele SR. PIV SL. Discharge pending improvement. Continue to monitor.

## 2022-01-05 NOTE — PROGRESS NOTES
Observation goals  PRIOR TO DISCHARGE       Comments: List all  goals to be met before discharge:   - Diagnostic tests and consults completed and resulted: met    - No further episodes of syncope and any new arrhythmia addressed with controlled heart rates: met   - Vital signs normal or at patient baseline and orthostatic vitals are normal and patient not lightheaded with standing: met   - Tolerating oral intake to maintain hydration: met   - Safe disposition plan has been identified: not met   - Nurse to notify provider when observation goals have been met and patient is ready for discharge.

## 2022-01-05 NOTE — DISCHARGE SUMMARY
[facility]  Hospitalist Discharge Summary      Date of Admission:  1/3/2022  Date of Discharge:  1/5/2022  Discharging Provider: Александр Lynne MD      Discharge Diagnoses   Syncopal episode  Troponin elevation  COVID-19 PCR positive  Hypokalemia  Hypothyroidism  Hypothyroid  Bilateral nasal bone fractures    Follow-ups Needed After Discharge   Follow-up Appointments     Follow-up and recommended labs and tests       Follow up with primary care provider, Physician No Ref-Primary, within 14   days for hospital follow- up.  No follow up labs or test are needed.             Discharge Disposition   Discharged to home  Condition at discharge: Stable    Hospital Course     José Manuel Cuadra is a 61 year old female admitted on 1/3/2022. She presented after a syncopal episode.     Syncopal episode  Registered to observation.  - TTE showed LVEF 60-65%, mild valve disease.  - Telemetry showed sinus rhythm.  - Orthostatics negative.  - PT consulted, but no needs identified.  - She was given IV fluids.  - Has been ambulating in the room without difficulty and feels ready for discharge home.  - Discharge home today.  - Follow-up with PCP in two weeks.  - Discussed reasons to seek medical attention prior to follow-up.     Troponin elevation  Suspect due to demand ischemia.  - Serial troponins flat.  - Monitor telemetry.  - Consider stress test after she recovers from her acute illness.    COVID-19 PCR positive  Very mildly asymptomatic (mild cough).  - Monitor O2, no hypoxia noted, not on supplemental O2.  - No indication for steroids, remdesivir or other meds at this point.  - Has been vaccinated against COVID-19 (three shots of the Pfizer vaccine), so I don't believe she qualifies for the monoclonal antibodies as an outpatient. She was provided with a phone number to call if she has any further questions about this.  - Discussed reasons to seek medical attention.    Hypokalemia  - Resolved with  replacement.    Hypothyroidism  - Continue PTA metoprolol and hydrochlorothiazide.     Hypothyroid  - Continue PTA synthroid.     Bilateral nasal bone fractures  - Age indeterminate on CT facial bone.  - Tylenol and oxycodone as needed for pain.  - Follow-up with PCP, consider ENT follow-up as needed.    Consultations This Hospital Stay   PHYSICAL THERAPY ADULT IP CONSULT    Code Status   Full Code    Time Spent on this Encounter   I, Александр Lynne MD, personally saw the patient today and spent greater than 30 minutes discharging this patient.       Александр Lynne MD  [facility]TENDED RECOV[facility]ORT STAY  [address] [facility] [address]  Phone: [phone]  ______________________________________________________________________    Physical Exam   Vital Signs: Temp: 97.4  F (36.3  C) Temp src: Oral BP: (!) 156/77 Pulse: 60   Resp: 18 SpO2: 95 % O2 Device: None (Room air)    Weight: 115 lbs 4.81 oz  Constitutional: awake, alert, cooperative, no apparent distress, sitting at the edge of the bed  Respiratory: clear to auscultation bilaterally, no crackles or wheezing  Cardiovascular: regular rate and rhythm, normal S1 and S2, no murmur noted  GI: normal bowel sounds, soft, non-distended, non-tender  Skin: warm, dry  Musculoskeletal: no lower extremity pitting edema present  Neurologic: awake, alert, oriented to name, place and time, motor is 5 out of 5 bilaterally, sensory is intact       Primary Care Physician   Physician No Ref-Primary    Discharge Orders      COVID-19 GetWell Loop Referral      Reason for your hospital stay    You were in the hospital due to syncope. You were also found to be positive for COVID-19, but you were no in the hospital for COVID-19.     Follow-up and recommended labs and tests     Follow up with primary care provider, Physician No Ref-Primary, within 14 days for hospital follow- up.  No follow up labs or test are needed.     Contact provider     Contact your primary care provider if If increased trouble breathing, new arm/leg swelling, dizziness/passing out, falls, bleeding that doesn't stop, or uncontrolled pain.     Discharge - Quarantine/Isolation Instruction    Date of symptom onset:   1/1/22  Date of first positive test:  1/3/22  If you tested positive COVID-19 and show symptoms (fever, cough, body aches or trouble breathing):        Stay home and away from others (self-isolate) until:        At least 10 days have passed since your symptoms started. AND...        You've had no fever-and no medicine that reduces fever for 1 full day (24 hours). AND...         Your other symptoms have resolved (gotten better).  If you tested positive for COVID-19 but don't show symptoms:       Stay home and away from others (self-isolate) until at least 10 days have passed since the date of your first positive COVID-19 test.     Activity    Your activity upon discharge: activity as tolerated     Discharge Instructions    You can call [phone] with questions about monoclonal antibodies. I am completing the online form through [employer] to see if you will be able to get the monoclonal antibodies, they will contact you to let you know if you are eligible.     Diet    Follow this diet upon discharge: Regular Diet Adult     Significant Results and Procedures   Most Recent 3 CBC's:Recent Labs   Lab Test 01/05/22  0649 01/03/22  1256   WBC 2.5* 3.1*   HGB 14.2 14.6   MCV 87 89    220     Most Recent 3 BMP's:Recent Labs   Lab Test 01/05/22  0649 01/04/22  2046 01/04/22  0542 01/03/22  1256     --  136 139   POTASSIUM 3.6 4.3 3.3* 3.4   CHLORIDE 108  --  104 105   CO2 24  --  26 27   BUN 11  --  10 13   CR 0.59  --  0.50* 0.67   ANIONGAP 6  --  6 7   MAVERICK 8.7  --  9.1 9.5   GLC 90  --  110* 121*     Results for orders placed or performed during the hospital encounter of 01/03/22   CT Head w/o Contrast    Narrative    CT SCAN OF THE HEAD WITHOUT CONTRAST   1/3/2022  2:32 PM     HISTORY: Facial trauma. Syncope.    TECHNIQUE:  Axial images of the head and coronal reformations without  IV contrast material. Radiation dose for this scan was reduced using  automated exposure control, adjustment of the mA and/or kV according  to patient size, or iterative reconstruction technique.    COMPARISON: Brain MR 7/6/2004.    FINDINGS: No evidence of acute intracranial hemorrhage. No mass effect  or midline shift. Indeterminate calcification in the right frontal  lobe. No mass effect or midline shift. No abnormal extra axial fluid  collection. Ventricular size is within normal limits without evidence  of hydrocephalus.    Please see dedicated facial bone CT for details of the facial bones.      Impression    IMPRESSION:     1. No evidence of acute intracranial hemorrhage, mass, or herniation.  2. Punctate calcification in the right frontal lobe probably due to  previous infectious, ischemic, or inflammatory insult.      KAMLA ASHRAF MD         SYSTEM ID:  [id number]   CT Facial Bones without Contrast    Narrative    CT SCAN OF THE FACE WITHOUT CONTRAST January 3, 2022 2:32 PM     HISTORY: Facial trauma.     TECHNIQUE: Axial CT images of the facial bones were completed with  sagittal and coronal reformations. Radiation dose for this scan was  reduced using automated exposure control, adjustment of the mA and/or  kV according to patient size, or iterative reconstruction technique.     COMPARISON: None.     FINDINGS:  No significant soft tissue swelling. Age-indeterminate  bilateral nasal bone fractures which are mildly displaced. No other  facial bone fractures are appreciated.     The visualized paranasal sinuses are clear.     No mass identified within the visualized soft tissues of the neck. The  visualized intracranial structures are unremarkable.       Impression    IMPRESSION: Age-indeterminate bilateral nasal bone fractures. No other  facial bone fractures appreciated.     KAMLA ASHRAF,  MD         SYSTEM ID:  [id number]   CT Chest Pulmonary Embolism w Contrast    Narrative    EXAM: CT CHEST PULMONARY EMBOLISM W CONTRAST  LOCATION: [facility]  DATE/TIME: 1/3/2022 5:25 PM    INDICATION: PE suspected, low/intermediate prob, positive D-dimer  COMPARISON: None.  TECHNIQUE: CT chest pulmonary angiogram during arterial phase injection of IV contrast. Multiplanar reformats and MIP reconstructions were performed. Dose reduction techniques were used.   CONTRAST: 56 mL Isovue-370    FINDINGS:  ANGIOGRAM CHEST: No evidence for pulmonary embolism. Pulmonary arteries normal in caliber. Thoracic aorta normal in caliber. No aortic dissection or other acute abnormality.    HEART: Cardiac chambers within normal limits. No pericardial effusion. Mild coronary artery calcification.    MEDIASTINUM: No adenopathy or mass.    LUNGS AND PLEURA: No pulmonary mass, consolidation, or suspicious pulmonary nodule. No pleural effusion or pneumothorax.    LIMITED UPPER ABDOMEN: Negative.    MUSCULOSKELETAL: Negative.      Impression    IMPRESSION:  1.  No evidence for pulmonary embolism or other acute abnormality in the chest.   Echocardiogram Complete     Value    LVEF  60-65%    Narrative    [id number]  [id number]  [id number]  [id number]^ZOLTAN^NOHEMI^ABRAHAM     [facility]  Echocardiography Laboratory  [address] [place] [address]     Name: DAJA DEJESUS  MRN: [id number]  : 1960  Study Date: 2022 04:04 PM  Age: 61 yrs  Gender: Female  Patient Location: [facility]  Reason For Study: Syncope  Ordering Physician: NOHEMI CHARLTON  Performed By: Alida Steele     BSA: 1.5 m2  Height: 60 in  Weight: 120 lb  HR: 63  BP: 149/81 mmHg  ______________________________________________________________________________  Procedure  Complete Portable Echo Adult.  ______________________________________________________________________________  Interpretation Summary     The visual  ejection fraction is 60-65%. There is mild concentric left  ventricular hypertrophy.  The right ventricle is normal in size and function.  There is mild to moderate (1-2+) tricuspid regurgitation.  Mild pulmonary hypertension  Mild valvular aortic stenosis. MG is 14-16 mm Hg. Mild AI.  The inferior vena cava was normal in size with preserved respiratory  variability.  There is no pericardial effusion.  ______________________________________________________________________________  Left Ventricle  The left ventricle is normal in size. There is mild concentric left  ventricular hypertrophy. Left ventricular systolic function is normal. The  visual ejection fraction is 60-65%. Diastolic Doppler findings (E/E' ratio  and/or other parameters) suggest left ventricular filling pressures are  indeterminate. No regional wall motion abnormalities noted.     Right Ventricle  The right ventricle is normal in size and function.     Atria  Normal left atrial size. Right atrial size is normal.     Mitral Valve  The mitral valve is normal in structure and function. There is mild (1+)  mitral regurgitation. There is no mitral valve stenosis.     Tricuspid Valve  The tricuspid valve is not well visualized, but is grossly normal. There is  mild to moderate (1-2+) tricuspid regurgitation. The right ventricular  systolic pressure is approximated at 40.0 mmHg plus the right atrial pressure.  Pulmonary hypertension.     Aortic Valve  The aortic valve is trileaflet with aortic valve sclerosis. There is mild (1+)  aortic regurgitation. The mean AoV pressure gradient is 16.2 mmHg. The  calculated aortic valve are is 1.8 cm^2. The peak AoV pressure gradient is  26.0 mmHg. Mild valvular aortic stenosis.     Pulmonic Valve  The pulmonic valve is not well visualized. There is trace pulmonic valvular  regurgitation.     Vessels  The aortic root is normal size. Normal size ascending aorta. The inferior vena  cava was normal in size with preserved  respiratory variability.     Pericardium  There is no pericardial effusion.     ______________________________________________________________________________  MMode/2D Measurements & Calculations  IVSd: 1.3 cm  LVIDd: 3.5 cm  LVIDs: 2.2 cm  LVPWd: 1.2 cm  FS: 38.4 %     LV mass(C)d: 147.8 grams  LV mass(C)dI: 98.4 grams/m2  Ao root diam: 2.6 cm  LA dimension: 3.3 cm  asc Aorta Diam: 3.4 cm  LA/Ao: 1.3  LVOT diam: 1.8 cm  LVOT area: 2.5 cm2  LA Volume (BP): 35.0 ml  LA Volume Index (BP): 23.3 ml/m2  RWT: 0.70     Doppler Measurements & Calculations  MV E max tereso: 58.7 cm/sec  MV A max tereso: 71.3 cm/sec  MV E/A: 0.82     MV dec slope: 200.1 cm/sec2  Ao V2 max: 255.2 cm/sec  Ao max P.0 mmHg  Ao V2 mean: 174.7 cm/sec  Ao mean P.2 mmHg  Ao V2 VTI: 43.8 cm  VASILIY(I,D): 1.8 cm2  VASILIY(V,D): 1.8 cm2  AI P1/2t: 555.6 msec  LV V1 max P.0 mmHg  LV V1 max: 180.0 cm/sec  LV V1 VTI: 31.5 cm  SV(LVOT): 78.8 ml  SI(LVOT): 52.5 ml/m2  PA acc time: 0.11 sec  TR max tereso: 263.3 cm/sec  TR max P.0 mmHg  AV Tereso Ratio (DI): 0.71  VASILIY Index (cm2/m2): 1.2  E/E' av.4  Lateral E/e': 9.3  Medial E/e': 9.5     ______________________________________________________________________________  Report approved by: Thien Iqbal 2022 05:04 PM           Discharge Medications   Current Discharge Medication List      START taking these medications    Details   guaiFENesin (MUCINEX) 600 MG 12 hr tablet Take 1 tablet (600 mg) by mouth 2 times daily for 10 days  Qty: 20 tablet, Refills: 0    Associated Diagnoses: COVID-19         CONTINUE these medications which have NOT CHANGED    Details   albuterol (PROAIR HFA/PROVENTIL HFA/VENTOLIN HFA) 108 (90 Base) MCG/ACT inhaler Inhale 2 puffs into the lungs every 6 hours      calcium carbonate-vitamin D (OSCAL W/D) 500-200 MG-UNIT tablet Take 1 tablet by mouth 2 times daily      fluticasone (FLONASE) 50 MCG/ACT nasal spray Spray 1 spray into both nostrils daily       hydrochlorothiazide (HYDRODIURIL) 12.5 MG tablet Take 12.5 mg by mouth daily      levothyroxine (SYNTHROID/LEVOTHROID) 75 MCG tablet Take 75 mcg by mouth daily      metoprolol succinate ER (TOPROL-XL) 50 MG 24 hr tablet Take 50 mg by mouth daily           Allergies   Allergies   Allergen Reactions     Aspirin      stuffy nose     Ibuprofen      Hard to breathe, cannot swallow     Sertraline Hcl      don't remember reaction

## 2022-01-05 NOTE — PLAN OF CARE
Patient alert & oriented x4. Mucinex given at bedtime. VSS. Fluids running continuous. New IV placed. No reports of nausea/vomitting. Airborne precautions maintained. Will give report to nurse at shift change.

## 2022-01-05 NOTE — PLAN OF CARE
AVS given to patient, all questions answered. Medication with patient. All belongings with patient. Escorted to taxi by transport NA.

## 2022-10-21 ENCOUNTER — APPOINTMENT (OUTPATIENT)
Dept: GENERAL RADIOLOGY | Facility: CLINIC | Age: 62
End: 2022-10-21
Attending: EMERGENCY MEDICINE
Payer: COMMERCIAL

## 2022-10-21 ENCOUNTER — HOSPITAL ENCOUNTER (EMERGENCY)
Facility: CLINIC | Age: 62
Discharge: HOME | End: 2022-10-21
Attending: EMERGENCY MEDICINE | Admitting: EMERGENCY MEDICINE
Payer: COMMERCIAL

## 2022-10-21 VITALS
RESPIRATION RATE: 22 BRPM | DIASTOLIC BLOOD PRESSURE: 72 MMHG | OXYGEN SATURATION: 97 % | TEMPERATURE: 98.4 F | SYSTOLIC BLOOD PRESSURE: 133 MMHG | WEIGHT: 120 LBS | HEART RATE: 55 BPM | HEIGHT: 60 IN | BODY MASS INDEX: 23.56 KG/M2

## 2022-10-21 DIAGNOSIS — J40 BRONCHITIS: ICD-10-CM

## 2022-10-21 LAB
ALBUMIN SERPL-MCNC: 3.8 G/DL (ref 3.4–5)
ALP SERPL-CCNC: 62 U/L (ref 40–150)
ALT SERPL W P-5'-P-CCNC: 27 U/L (ref 0–50)
ANION GAP SERPL CALCULATED.3IONS-SCNC: 8 MMOL/L (ref 3–14)
AST SERPL W P-5'-P-CCNC: 29 U/L (ref 0–45)
ATRIAL RATE - MUSE: 70 BPM
BASOPHILS # BLD AUTO: 0 10E3/UL (ref 0–0.2)
BASOPHILS NFR BLD AUTO: 1 %
BILIRUB SERPL-MCNC: 1.7 MG/DL (ref 0.2–1.3)
BUN SERPL-MCNC: 10 MG/DL (ref 7–30)
CALCIUM SERPL-MCNC: 9.9 MG/DL (ref 8.5–10.1)
CHLORIDE BLD-SCNC: 103 MMOL/L (ref 94–109)
CO2 SERPL-SCNC: 29 MMOL/L (ref 20–32)
CREAT SERPL-MCNC: 0.51 MG/DL (ref 0.52–1.04)
DIASTOLIC BLOOD PRESSURE - MUSE: NORMAL MMHG
EOSINOPHIL # BLD AUTO: 0.3 10E3/UL (ref 0–0.7)
EOSINOPHIL NFR BLD AUTO: 4 %
ERYTHROCYTE [DISTWIDTH] IN BLOOD BY AUTOMATED COUNT: 12.5 % (ref 10–15)
FLUAV RNA SPEC QL NAA+PROBE: NEGATIVE
FLUBV RNA RESP QL NAA+PROBE: NEGATIVE
GFR SERPL CREATININE-BSD FRML MDRD: >90 ML/MIN/1.73M2
GLUCOSE BLD-MCNC: 99 MG/DL (ref 70–99)
HCT VFR BLD AUTO: 43.2 % (ref 35–47)
HGB BLD-MCNC: 14.1 G/DL (ref 11.7–15.7)
IMM GRANULOCYTES # BLD: 0 10E3/UL
IMM GRANULOCYTES NFR BLD: 0 %
INTERPRETATION ECG - MUSE: NORMAL
LYMPHOCYTES # BLD AUTO: 1.3 10E3/UL (ref 0.8–5.3)
LYMPHOCYTES NFR BLD AUTO: 18 %
MCH RBC QN AUTO: 29.4 PG (ref 26.5–33)
MCHC RBC AUTO-ENTMCNC: 32.6 G/DL (ref 31.5–36.5)
MCV RBC AUTO: 90 FL (ref 78–100)
MONOCYTES # BLD AUTO: 0.5 10E3/UL (ref 0–1.3)
MONOCYTES NFR BLD AUTO: 7 %
NEUTROPHILS # BLD AUTO: 5 10E3/UL (ref 1.6–8.3)
NEUTROPHILS NFR BLD AUTO: 70 %
NRBC # BLD AUTO: 0 10E3/UL
NRBC BLD AUTO-RTO: 0 /100
P AXIS - MUSE: 81 DEGREES
PLATELET # BLD AUTO: 222 10E3/UL (ref 150–450)
POTASSIUM BLD-SCNC: 4.1 MMOL/L (ref 3.4–5.3)
PR INTERVAL - MUSE: 136 MS
PROT SERPL-MCNC: 8 G/DL (ref 6.8–8.8)
QRS DURATION - MUSE: 86 MS
QT - MUSE: 400 MS
QTC - MUSE: 432 MS
R AXIS - MUSE: 73 DEGREES
RBC # BLD AUTO: 4.8 10E6/UL (ref 3.8–5.2)
RSV RNA SPEC NAA+PROBE: NEGATIVE
SARS-COV-2 RNA RESP QL NAA+PROBE: NEGATIVE
SODIUM SERPL-SCNC: 140 MMOL/L (ref 133–144)
SYSTOLIC BLOOD PRESSURE - MUSE: NORMAL MMHG
T AXIS - MUSE: 35 DEGREES
VENTRICULAR RATE- MUSE: 70 BPM
WBC # BLD AUTO: 7.1 10E3/UL (ref 4–11)

## 2022-10-21 PROCEDURE — 93005 ELECTROCARDIOGRAM TRACING: CPT

## 2022-10-21 PROCEDURE — 36415 COLL VENOUS BLD VENIPUNCTURE: CPT | Performed by: EMERGENCY MEDICINE

## 2022-10-21 PROCEDURE — 80053 COMPREHEN METABOLIC PANEL: CPT | Performed by: EMERGENCY MEDICINE

## 2022-10-21 PROCEDURE — C9803 HOPD COVID-19 SPEC COLLECT: HCPCS

## 2022-10-21 PROCEDURE — 71046 X-RAY EXAM CHEST 2 VIEWS: CPT

## 2022-10-21 PROCEDURE — 99285 EMERGENCY DEPT VISIT HI MDM: CPT | Mod: 25

## 2022-10-21 PROCEDURE — 85014 HEMATOCRIT: CPT | Performed by: EMERGENCY MEDICINE

## 2022-10-21 PROCEDURE — 87637 SARSCOV2&INF A&B&RSV AMP PRB: CPT | Performed by: EMERGENCY MEDICINE

## 2022-10-21 RX ORDER — ALBUTEROL SULFATE 0.83 MG/ML
2.5 SOLUTION RESPIRATORY (INHALATION) EVERY 6 HOURS PRN
Qty: 75 ML | Refills: 0 | Status: SHIPPED | OUTPATIENT
Start: 2022-10-21

## 2022-10-21 ASSESSMENT — ACTIVITIES OF DAILY LIVING (ADL)
ADLS_ACUITY_SCORE: 35
ADLS_ACUITY_SCORE: 33

## 2022-10-21 ASSESSMENT — ENCOUNTER SYMPTOMS
VOMITING: 0
COUGH: 1
SHORTNESS OF BREATH: 1
SORE THROAT: 0
DIARRHEA: 0
NAUSEA: 0
BACK PAIN: 1
CHILLS: 1
FEVER: 0

## 2022-10-21 NOTE — ED TRIAGE NOTES
Coughing for past week - nonproductive - denies any SOB denies fever   Pt works at school   With all the coughing back pain      Triage Assessment     Row Name 10/21/22 1014       Triage Assessment (Adult)    Airway WDL WDL       Respiratory WDL    Respiratory WDL WDL       Cardiac WDL    Cardiac WDL WDL       Cognitive/Neuro/Behavioral WDL    Cognitive/Neuro/Behavioral WDL WDL

## 2022-10-21 NOTE — ED NOTES
Bedside with pt and provider while  is on the phone. Pt stating that she has a cough that when she coughs she has trouble breathing and that her provider sent her here for evaluation .

## 2022-10-21 NOTE — ED PROVIDER NOTES
History   Chief Complaint:  Cough       HPI   José Manuel Cuadra is a 62 year old female with history of hypertension who presents with cough. The patient reports that she has been having a productive cough starting Monday. The patient has chills as well. She states that when she coughs she has onset of back pain and shortness of breath. She reports when she is not coughing she has mild back pain but denies shortness of breath. The patient denies any chest pain. She denies any fever, sore throat, and congestion. The patient denies diarrhea, nausea, and vomiting. 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 The patient denies any recent travel.     Review of Systems   Constitutional: Positive for chills. Negative for fever.   HENT: Negative for congestion and sore throat.    Respiratory: Positive for cough and shortness of breath.    Cardiovascular: Negative for chest pain.   Gastrointestinal: Negative for diarrhea, nausea and vomiting.   Musculoskeletal: Positive for back pain.   All other systems reviewed and are negative.    Allergies:  Aspirin  Ibuprofen  Sertraline Hcl  Morphine     Medications:  Albuterol   Hydrodiuril   Synthroid   Toprol    DuoNeb     Past Medical History:     Psoriasis   Hypertension   Hypothyroidism     Past Surgical History:    Vaginal hysterectomy   Blepharoplasty   Lasik eye     Family History:    Sister- breast cancer, cataract     Social History:  The patient presents to the ED with alone.   PCP: Pina Stark     Physical Exam     Patient Vitals for the past 24 hrs:   BP Temp Temp src Pulse Resp SpO2 Height Weight   10/21/22 2016 -- -- -- -- -- 97 % -- --   10/21/22 1927 -- -- -- -- -- 97 % -- --   10/21/22 1925 133/72 -- -- 55 -- -- -- --   10/21/22 1800 -- -- -- -- -- 97 % -- --   10/21/22 1730 -- -- -- -- -- 98 % -- --   10/21/22 1014 (!) 190/86 98.4  F (36.9  C) Oral 68 22 97 % 1.524 m (5') 54.4 kg (120 lb)       Physical  Exam  SKIN:  Warm, dry.  HEMATOLOGIC/IMMUNOLOGIC/LYMPHATIC:  No pallor.  No lower extremity edema.  No cervical adenopathy.  HENT:  Moist oral mucosa.  No oropharyngeal erythema.  Noninflamed tonsils without exudate.  Normal phonation.  No stridor.  EYES:  Conjunctivae normal.  CARDIOVASCULAR:  Regular rate and rhythm.  No murmur.  No rub.  RESPIRATORY:  No respiratory distress, breath sounds equal and normal.  MUSCULOSKELETAL: Normal body habitus.  NEUROLOGIC:  Alert, conversant.  PSYCHIATRIC:  Normal mood.    Emergency Department Course   ECG  ECG results from 10/21/22   EKG 12-lead, tracing only     Value    Systolic Blood Pressure     Diastolic Blood Pressure     Ventricular Rate 70    Atrial Rate 70    IL Interval 136    QRS Duration 86        QTc 432    P Axis 81    R AXIS 73    T Axis 35    Interpretation ECG      Sinus rhythm  Biatrial enlargement  Left ventricular hypertrophy ( Sokolow-Jansen )  Nonspecific ST and T wave abnormality  Abnormal ECG  When compared with ECG of 04-JAN-2022 01:58,  T wave inversion no longer evident in Inferior leads  T wave inversion no longer evident in Anterior leads  Confirmed by GENERATED REPORT, COMPUTER ([id number]),  Aasen, Bradley ([id number]) on 10/21/2022 7:06:26 PM         Imaging:  XR Chest 2 Views   Final Result   IMPRESSION: Negative chest.        Report per radiology    Laboratory:  Labs Ordered and Resulted from Time of ED Arrival to Time of ED Departure   COMPREHENSIVE METABOLIC PANEL - Abnormal       Result Value    Sodium 140      Potassium 4.1      Chloride 103      Carbon Dioxide (CO2) 29      Anion Gap 8      Urea Nitrogen 10      Creatinine 0.51 (*)     Calcium 9.9      Glucose 99      Alkaline Phosphatase 62      AST 29      ALT 27      Protein Total 8.0      Albumin 3.8      Bilirubin Total 1.7 (*)     GFR Estimate >90     INFLUENZA A/B & SARS-COV2 PCR MULTIPLEX - Normal    Influenza A PCR Negative      Influenza B PCR Negative      RSV PCR Negative       SARS CoV2 PCR Negative     CBC WITH PLATELETS AND DIFFERENTIAL    WBC Count 7.1      RBC Count 4.80      Hemoglobin 14.1      Hematocrit 43.2      MCV 90      MCH 29.4      MCHC 32.6      RDW 12.5      Platelet Count 222      % Neutrophils 70      % Lymphocytes 18      % Monocytes 7      % Eosinophils 4      % Basophils 1      % Immature Granulocytes 0      NRBCs per 100 WBC 0      Absolute Neutrophils 5.0      Absolute Lymphocytes 1.3      Absolute Monocytes 0.5      Absolute Eosinophils 0.3      Absolute Basophils 0.0      Absolute Immature Granulocytes 0.0      Absolute NRBCs 0.0          Procedures    Emergency Department Course:       Reviewed:  I reviewed nursing notes, vitals and past medical history    Assessments:  1720 I obtained history and examined the patient as noted above.     1954 I rechecked the patient and explained findings.     Disposition:  The patient was discharged to home.     Impression & Plan     CMS Diagnoses: None    Medical Decision Making:  The patient presents with symptoms of respiratory infectious disease.  Considered COVID-19, influenza and pneumonia.  These tests were negative.  Bronchitis a possibility.  Given her cough and posterior thoracic pain with cough I considered pulmonary embolism but given this productive cough that seems extremely unlikely.  EKG reassuring without any findings of cardiogenic strain.  I did not think she required antibiotics for what is likely a viral illness.  Certainly possible the COVID or flu testing with false negatives but relatively unlikely.  She requested a refill of her nebulized albuterol.  I provided a refill and otherwise advised to take any over-the-counter cough syrup and/or Mucinex regarding her symptoms.  Recommended follow-up if not improving.    Diagnosis:    ICD-10-CM    1. Bronchitis  J40           Discharge Medications:  Discharge Medication List as of 10/21/2022  8:12 PM      START taking these medications    Details   albuterol  (PROVENTIL) (2.5 MG/3ML) 0.083% neb solution Take 1 vial (2.5 mg) by nebulization every 6 hours as needed for shortness of breath / dyspnea or wheezing, Disp-75 mL, R-0, E-Prescribe             Scribe Disclosure:  WILLY, Koki Marshall, am serving as a scribe at 5:15 PM on 10/21/2022 to document services personally performed by Haja Larios MD based on my observations and the provider's statements to me.         Haja Larios MD  10/21/22 2125

## (undated) RX ORDER — ONDANSETRON 2 MG/ML
INJECTION INTRAMUSCULAR; INTRAVENOUS
Status: DISPENSED
Start: 2017-07-06

## (undated) RX ORDER — FENTANYL CITRATE 50 UG/ML
INJECTION, SOLUTION INTRAMUSCULAR; INTRAVENOUS
Status: DISPENSED
Start: 2017-07-06